# Patient Record
Sex: FEMALE | Race: BLACK OR AFRICAN AMERICAN
[De-identification: names, ages, dates, MRNs, and addresses within clinical notes are randomized per-mention and may not be internally consistent; named-entity substitution may affect disease eponyms.]

---

## 2017-01-25 ENCOUNTER — HOSPITAL ENCOUNTER (OUTPATIENT)
Age: 37
Discharge: HOME | End: 2017-01-25
Payer: COMMERCIAL

## 2017-01-25 DIAGNOSIS — L29.8: Primary | ICD-10-CM

## 2017-03-29 ENCOUNTER — HOSPITAL ENCOUNTER (EMERGENCY)
Age: 37
Discharge: HOME | End: 2017-03-29
Payer: COMMERCIAL

## 2017-03-29 DIAGNOSIS — T86.840: ICD-10-CM
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
## 2017-03-29 DIAGNOSIS — S05.92XA: ICD-10-CM

## 2017-03-29 DIAGNOSIS — H18.50: ICD-10-CM
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
## 2017-03-29 DIAGNOSIS — H54.11: Primary | ICD-10-CM

## 2017-03-29 PROCEDURE — 99283 EMERGENCY DEPT VISIT LOW MDM: CPT

## 2017-03-29 PROCEDURE — 99282 EMERGENCY DEPT VISIT SF MDM: CPT

## 2018-03-25 ENCOUNTER — HOSPITAL ENCOUNTER (EMERGENCY)
Dept: HOSPITAL 76 - ED | Age: 38
Discharge: HOME | End: 2018-03-25
Payer: MEDICAID

## 2018-03-25 VITALS — DIASTOLIC BLOOD PRESSURE: 90 MMHG | SYSTOLIC BLOOD PRESSURE: 147 MMHG

## 2018-03-25 DIAGNOSIS — R10.9: Primary | ICD-10-CM

## 2018-03-25 LAB
ALBUMIN DIAFP-MCNC: 4.2 G/DL (ref 3.2–5.5)
ALBUMIN/GLOB SERPL: 0.9 {RATIO} (ref 1–2.2)
ALP SERPL-CCNC: 57 IU/L (ref 42–121)
ALT SERPL W P-5'-P-CCNC: 27 IU/L (ref 10–60)
ANION GAP SERPL CALCULATED.4IONS-SCNC: 8 MMOL/L (ref 6–13)
AST SERPL W P-5'-P-CCNC: 21 IU/L (ref 10–42)
BASOPHILS NFR BLD AUTO: 0.1 10^3/UL (ref 0–0.1)
BASOPHILS NFR BLD AUTO: 1.1 %
BILIRUB BLD-MCNC: 0.5 MG/DL (ref 0.2–1)
BUN SERPL-MCNC: 6 MG/DL (ref 6–20)
CALCIUM UR-MCNC: 9 MG/DL (ref 8.5–10.3)
CHLORIDE SERPL-SCNC: 101 MMOL/L (ref 101–111)
CLARITY UR REFRACT.AUTO: CLEAR
CO2 SERPL-SCNC: 28 MMOL/L (ref 21–32)
CREAT SERPLBLD-SCNC: 0.5 MG/DL (ref 0.4–1)
EOSINOPHIL # BLD AUTO: 0.3 10^3/UL (ref 0–0.7)
EOSINOPHIL NFR BLD AUTO: 5.7 %
ERYTHROCYTE [DISTWIDTH] IN BLOOD BY AUTOMATED COUNT: 13.7 % (ref 12–15)
GFRSERPLBLD MDRD-ARVRAT: 170 ML/MIN/{1.73_M2} (ref 89–?)
GLOBULIN SER-MCNC: 4.5 G/DL (ref 2.1–4.2)
GLUCOSE SERPL-MCNC: 84 MG/DL (ref 70–100)
GLUCOSE UR QL STRIP.AUTO: NEGATIVE MG/DL
HCG UR QL: NEGATIVE
HGB UR QL STRIP: 13.6 G/DL (ref 12–16)
KETONES UR QL STRIP.AUTO: NEGATIVE MG/DL
LIPASE SERPL-CCNC: 19 U/L (ref 22–51)
LYMPHOCYTES # SPEC AUTO: 2.5 10^3/UL (ref 1.5–3.5)
LYMPHOCYTES NFR BLD AUTO: 45 %
MCH RBC QN AUTO: 30.3 PG (ref 27–31)
MCHC RBC AUTO-ENTMCNC: 33.7 G/DL (ref 32–36)
MCV RBC AUTO: 89.8 FL (ref 81–99)
MONOCYTES # BLD AUTO: 0.7 10^3/UL (ref 0–1)
MONOCYTES NFR BLD AUTO: 11.7 %
NEUTROPHILS # BLD AUTO: 2.1 10^3/UL (ref 1.5–6.6)
NEUTROPHILS # SNV AUTO: 5.7 X10^3/UL (ref 4.8–10.8)
NEUTROPHILS NFR BLD AUTO: 36.5 %
NITRITE UR QL STRIP.AUTO: NEGATIVE
PDW BLD AUTO: 7.7 FL (ref 7.9–10.8)
PH UR STRIP.AUTO: 7 PH (ref 5–7.5)
PLATELET # BLD: 352 10^3/UL (ref 130–450)
PROT SPEC-MCNC: 8.7 G/DL (ref 6.7–8.2)
PROT UR STRIP.AUTO-MCNC: NEGATIVE MG/DL
RBC # UR STRIP.AUTO: NEGATIVE /UL
RBC MAR: 4.5 10^6/UL (ref 4.2–5.4)
SODIUM SERPLBLD-SCNC: 137 MMOL/L (ref 135–145)
SP GR UR STRIP.AUTO: 1.01 (ref 1–1.03)
UROBILINOGEN UR QL STRIP.AUTO: (no result) E.U./DL
UROBILINOGEN UR STRIP.AUTO-MCNC: NEGATIVE MG/DL

## 2018-03-25 PROCEDURE — 81025 URINE PREGNANCY TEST: CPT

## 2018-03-25 PROCEDURE — 85025 COMPLETE CBC W/AUTO DIFF WBC: CPT

## 2018-03-25 PROCEDURE — 87086 URINE CULTURE/COLONY COUNT: CPT

## 2018-03-25 PROCEDURE — 99283 EMERGENCY DEPT VISIT LOW MDM: CPT

## 2018-03-25 PROCEDURE — 81003 URINALYSIS AUTO W/O SCOPE: CPT

## 2018-03-25 PROCEDURE — 81001 URINALYSIS AUTO W/SCOPE: CPT

## 2018-03-25 PROCEDURE — 74177 CT ABD & PELVIS W/CONTRAST: CPT

## 2018-03-25 PROCEDURE — 80053 COMPREHEN METABOLIC PANEL: CPT

## 2018-03-25 PROCEDURE — 99284 EMERGENCY DEPT VISIT MOD MDM: CPT

## 2018-03-25 PROCEDURE — 36415 COLL VENOUS BLD VENIPUNCTURE: CPT

## 2018-03-25 PROCEDURE — 83690 ASSAY OF LIPASE: CPT

## 2018-09-20 ENCOUNTER — HOSPITAL ENCOUNTER (EMERGENCY)
Dept: HOSPITAL 76 - ED | Age: 38
Discharge: HOME | End: 2018-09-20
Payer: MEDICAID

## 2018-09-20 VITALS — SYSTOLIC BLOOD PRESSURE: 120 MMHG | DIASTOLIC BLOOD PRESSURE: 86 MMHG

## 2018-09-20 DIAGNOSIS — J45.901: Primary | ICD-10-CM

## 2018-09-20 DIAGNOSIS — J20.9: ICD-10-CM

## 2018-09-20 DIAGNOSIS — J01.90: ICD-10-CM

## 2018-09-20 PROCEDURE — 94640 AIRWAY INHALATION TREATMENT: CPT

## 2018-09-20 PROCEDURE — 94664 DEMO&/EVAL PT USE INHALER: CPT

## 2018-09-20 PROCEDURE — 99283 EMERGENCY DEPT VISIT LOW MDM: CPT

## 2019-04-06 ENCOUNTER — HOSPITAL ENCOUNTER (EMERGENCY)
Dept: HOSPITAL 76 - ED | Age: 39
Discharge: HOME | End: 2019-04-06
Payer: MEDICAID

## 2019-04-06 VITALS — DIASTOLIC BLOOD PRESSURE: 93 MMHG | SYSTOLIC BLOOD PRESSURE: 136 MMHG

## 2019-04-06 DIAGNOSIS — L98.8: Primary | ICD-10-CM

## 2019-04-06 PROCEDURE — 99283 EMERGENCY DEPT VISIT LOW MDM: CPT

## 2019-04-06 PROCEDURE — 10140 I&D HMTMA SEROMA/FLUID COLLJ: CPT

## 2019-04-06 PROCEDURE — 99282 EMERGENCY DEPT VISIT SF MDM: CPT

## 2019-12-04 ENCOUNTER — HOSPITAL ENCOUNTER (EMERGENCY)
Dept: HOSPITAL 76 - ED | Age: 39
Discharge: HOME | End: 2019-12-04
Payer: COMMERCIAL

## 2019-12-04 VITALS — SYSTOLIC BLOOD PRESSURE: 127 MMHG | DIASTOLIC BLOOD PRESSURE: 75 MMHG

## 2019-12-04 DIAGNOSIS — M94.0: Primary | ICD-10-CM

## 2019-12-04 PROCEDURE — 99284 EMERGENCY DEPT VISIT MOD MDM: CPT

## 2019-12-04 PROCEDURE — 99283 EMERGENCY DEPT VISIT LOW MDM: CPT

## 2019-12-04 PROCEDURE — 71046 X-RAY EXAM CHEST 2 VIEWS: CPT

## 2019-12-16 ENCOUNTER — HOSPITAL ENCOUNTER (OUTPATIENT)
Dept: HOSPITAL 76 - DI | Age: 39
Discharge: HOME | End: 2019-12-16
Attending: PHYSICIAN ASSISTANT
Payer: COMMERCIAL

## 2019-12-16 DIAGNOSIS — N64.4: Primary | ICD-10-CM

## 2019-12-16 PROCEDURE — 77066 DX MAMMO INCL CAD BI: CPT

## 2019-12-16 PROCEDURE — 76642 ULTRASOUND BREAST LIMITED: CPT

## 2020-02-12 ENCOUNTER — HOSPITAL ENCOUNTER (OUTPATIENT)
Dept: HOSPITAL 76 - EMS | Age: 40
Discharge: ANOTHER HOSPITAL | End: 2020-02-12
Attending: SURGERY
Payer: COMMERCIAL

## 2020-02-12 ENCOUNTER — HOSPITAL ENCOUNTER (EMERGENCY)
Dept: HOSPITAL 76 - ED | Age: 40
Discharge: HOME | End: 2020-02-12
Payer: COMMERCIAL

## 2020-02-12 VITALS — DIASTOLIC BLOOD PRESSURE: 100 MMHG | SYSTOLIC BLOOD PRESSURE: 145 MMHG
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
## 2020-02-12 DIAGNOSIS — M25.562: Primary | ICD-10-CM
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
## 2020-02-12 DIAGNOSIS — R03.0: ICD-10-CM
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
## 2020-02-12 DIAGNOSIS — S80.02XA: Primary | ICD-10-CM

## 2020-02-12 PROCEDURE — 73564 X-RAY EXAM KNEE 4 OR MORE: CPT

## 2020-02-12 PROCEDURE — 99283 EMERGENCY DEPT VISIT LOW MDM: CPT

## 2020-08-04 ENCOUNTER — HOSPITAL ENCOUNTER (EMERGENCY)
Age: 40
Discharge: HOME | End: 2020-08-04
Payer: COMMERCIAL

## 2020-08-04 VITALS
RESPIRATION RATE: 23 BRPM | HEART RATE: 74 BPM | SYSTOLIC BLOOD PRESSURE: 146 MMHG | DIASTOLIC BLOOD PRESSURE: 87 MMHG | OXYGEN SATURATION: 99 %

## 2020-08-04 VITALS
RESPIRATION RATE: 18 BRPM | DIASTOLIC BLOOD PRESSURE: 89 MMHG | HEART RATE: 76 BPM | OXYGEN SATURATION: 98 % | SYSTOLIC BLOOD PRESSURE: 152 MMHG

## 2020-08-04 VITALS — RESPIRATION RATE: 22 BRPM | HEART RATE: 80 BPM

## 2020-08-04 VITALS — HEART RATE: 67 BPM | RESPIRATION RATE: 16 BRPM | OXYGEN SATURATION: 99 %

## 2020-08-04 VITALS
SYSTOLIC BLOOD PRESSURE: 185 MMHG | RESPIRATION RATE: 22 BRPM | OXYGEN SATURATION: 97 % | DIASTOLIC BLOOD PRESSURE: 102 MMHG | TEMPERATURE: 99.1 F | HEART RATE: 88 BPM

## 2020-08-04 VITALS
HEART RATE: 73 BPM | OXYGEN SATURATION: 100 % | SYSTOLIC BLOOD PRESSURE: 136 MMHG | RESPIRATION RATE: 19 BRPM | DIASTOLIC BLOOD PRESSURE: 87 MMHG

## 2020-08-04 VITALS — HEART RATE: 68 BPM | OXYGEN SATURATION: 100 % | RESPIRATION RATE: 21 BRPM

## 2020-08-04 VITALS
DIASTOLIC BLOOD PRESSURE: 89 MMHG | RESPIRATION RATE: 25 BRPM | OXYGEN SATURATION: 100 % | SYSTOLIC BLOOD PRESSURE: 152 MMHG | HEART RATE: 82 BPM

## 2020-08-04 VITALS — RESPIRATION RATE: 14 BRPM | OXYGEN SATURATION: 100 %

## 2020-08-04 VITALS — HEART RATE: 74 BPM | RESPIRATION RATE: 17 BRPM

## 2020-08-04 VITALS — HEART RATE: 75 BPM | RESPIRATION RATE: 24 BRPM | SYSTOLIC BLOOD PRESSURE: 148 MMHG | DIASTOLIC BLOOD PRESSURE: 91 MMHG

## 2020-08-04 VITALS — RESPIRATION RATE: 18 BRPM | HEART RATE: 78 BPM

## 2020-08-04 VITALS — HEART RATE: 77 BPM | RESPIRATION RATE: 21 BRPM

## 2020-08-04 DIAGNOSIS — R07.9: ICD-10-CM

## 2020-08-04 DIAGNOSIS — R79.89: ICD-10-CM

## 2020-08-04 DIAGNOSIS — R51: ICD-10-CM

## 2020-08-04 DIAGNOSIS — J45.901: Primary | ICD-10-CM

## 2020-08-04 DIAGNOSIS — R06.82: ICD-10-CM

## 2020-08-04 DIAGNOSIS — R60.9: ICD-10-CM

## 2020-08-04 LAB
ADD MANUAL DIFF / SLIDE REVIEW: NO
ALBUMIN SERPL-MCNC: 5.1 G/DL (ref 3.5–5)
ALBUMIN/GLOB SERPL: 1 {RATIO} (ref 1–2.8)
ALP SERPL-CCNC: 69 U/L (ref 38–126)
ALT SERPL-CCNC: 36 IU/L (ref ?–35)
BUN SERPL-MCNC: 7 MG/DL (ref 7–17)
CALCIUM SERPL-MCNC: 10.2 MG/DL (ref 8.4–10.2)
CHLORIDE SERPL-SCNC: 103 MMOL/L (ref 98–107)
CK SERPL-CCNC: 133 U/L (ref 30–135)
CKMB % RELATIVE INDEX: 0.6 % (ref 1.5–5)
CO2 SERPL-SCNC: 22 MMOL/L (ref 22–32)
CULTURE INDICATED URINE: (no result)
ESTIMATED GLOMERULAR FILT RATE: > 60 ML/MIN (ref 60–?)
FERRITIN SERPL-MCNC: 16 NG/ML (ref 6–137)
GLOBULIN SER CALC-MCNC: 5 G/DL (ref 1.7–4.1)
GLUCOSE SERPL-MCNC: 163 MG/DL (ref 70–100)
HEMATOCRIT: 43.1 % (ref 36–46)
HEMOGLOBIN: 14.4 G/DL (ref 12–16)
HEMOLYSIS: 39 (ref 0–50)
INR PPP: 1.1 (ref 0.9–1.3)
LACTATE SERPL-MCNC: 2.1 MMOL/L (ref 0.7–2.1)
LYMPHOCYTES # SPEC AUTO: 1600 /UL (ref 1100–4500)
MAGNESIUM SERPL-MCNC: 2.1 MG/DL (ref 1.6–2.3)
MCV RBC: 88.5 FL (ref 80–100)
MEAN CORPUSCULAR HEMOGLOBIN: 29.6 PG (ref 26–34)
MEAN CORPUSCULAR HGB CONC: 33.5 % (ref 30–36)
NT-PROBNP SERPL-MCNC: 40 PG/ML (ref ?–125)
PLATELET COUNT: 355 X10^3/UL (ref 150–400)
POTASSIUM SERPL-SCNC: 4.6 MMOL/L (ref 3.4–5.1)
PROCALCITONIN: < 0.05 NG/ML (ref ?–0.5)
PROT SERPL-MCNC: 10.1 G/DL (ref 6.3–8.2)
PROTHROMBIN TIME: 12.8 SECONDS (ref 10.1–12.7)
PTT PARTIAL THROMBOPLASTIN TIM: 28 SECONDS (ref 26.4–36.2)
SODIUM SERPL-SCNC: 139 MMOL/L (ref 137–145)
TROPONIN I SERPL-MCNC: < 0.012 NG/ML (ref 0.01–0.03)
URINE COMMENTS: (no result)

## 2020-08-04 PROCEDURE — 36415 COLL VENOUS BLD VENIPUNCTURE: CPT

## 2020-08-04 PROCEDURE — 71275 CT ANGIOGRAPHY CHEST: CPT

## 2020-08-04 PROCEDURE — 99284 EMERGENCY DEPT VISIT MOD MDM: CPT

## 2020-08-04 PROCEDURE — 83735 ASSAY OF MAGNESIUM: CPT

## 2020-08-04 PROCEDURE — 83880 ASSAY OF NATRIURETIC PEPTIDE: CPT

## 2020-08-04 PROCEDURE — 85651 RBC SED RATE NONAUTOMATED: CPT

## 2020-08-04 PROCEDURE — 96374 THER/PROPH/DIAG INJ IV PUSH: CPT

## 2020-08-04 PROCEDURE — 86140 C-REACTIVE PROTEIN: CPT

## 2020-08-04 PROCEDURE — 85025 COMPLETE CBC W/AUTO DIFF WBC: CPT

## 2020-08-04 PROCEDURE — 85379 FIBRIN DEGRADATION QUANT: CPT

## 2020-08-04 PROCEDURE — 87635 SARS-COV-2 COVID-19 AMP PRB: CPT

## 2020-08-04 PROCEDURE — 84484 ASSAY OF TROPONIN QUANT: CPT

## 2020-08-04 PROCEDURE — 83605 ASSAY OF LACTIC ACID: CPT

## 2020-08-04 PROCEDURE — 93005 ELECTROCARDIOGRAM TRACING: CPT

## 2020-08-04 PROCEDURE — 94150 VITAL CAPACITY TEST: CPT

## 2020-08-04 PROCEDURE — 81003 URINALYSIS AUTO W/O SCOPE: CPT

## 2020-08-04 PROCEDURE — 82550 ASSAY OF CK (CPK): CPT

## 2020-08-04 PROCEDURE — 71046 X-RAY EXAM CHEST 2 VIEWS: CPT

## 2020-08-04 PROCEDURE — 80053 COMPREHEN METABOLIC PANEL: CPT

## 2020-08-04 PROCEDURE — 96361 HYDRATE IV INFUSION ADD-ON: CPT

## 2020-08-04 PROCEDURE — 94640 AIRWAY INHALATION TREATMENT: CPT

## 2020-08-04 PROCEDURE — 87086 URINE CULTURE/COLONY COUNT: CPT

## 2020-08-04 PROCEDURE — 85730 THROMBOPLASTIN TIME PARTIAL: CPT

## 2020-08-04 PROCEDURE — 85610 PROTHROMBIN TIME: CPT

## 2020-08-04 PROCEDURE — 81015 MICROSCOPIC EXAM OF URINE: CPT

## 2020-08-04 PROCEDURE — 82728 ASSAY OF FERRITIN: CPT

## 2020-08-04 PROCEDURE — 82553 CREATINE MB FRACTION: CPT

## 2020-08-04 PROCEDURE — 84145 PROCALCITONIN (PCT): CPT

## 2020-08-04 NOTE — ED_ITS
"HPI - SOB/Dyspnea    
General    
Chief Complaint: Shortness of Breath/Dyspnea    
Stated Complaint: short of breath, headache    
Time Seen by Provider: 08/04/20 15:43    
Source: patient    
Mode of arrival: Ambulatory    
Limitations: no limitations    
History of Present Illness    
HPI Narrative: This is a 39 year old female, nonsmoker, has history of asthma   
and cornea transplant presents to ED with chief complain of mid chest tightness   
with short of breath, and frontal headache and malaise for last 2 days.  Patient  
reports she is not able to take full deep breaths and her short of breath is not  
improving using albuterol inhaler at home.  She reports it does not feels like   
her usual asthma problems.  She usually does not require to use inhaler daily.    
Patient states also noticed bilateral swelling last couple of days but this has   
improved today.  Patient denies calf or lower extremity discomfort.  Patient   
reports she had flew to [place] beginning of last month and returned home.    
She denies on hormone replacement, birth control pill, previous DVT or blood   
clots or previous cardiac history.  Patient reports 1 of her sisters had DVT and  
pulmonary embolism.      
    
Patient also reports frontal headache for last 2 days.  She denies weakness to 1  
extremities, speech difficulty, changes in her visions.  Patient denies neck   
rigidity or tenderness, unusual rashes.  Patient reports had hives 2 days ago   
which has resolved.  Patient denies cough, congestion, sore throat, diarrhea,   
constipation.  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  
contact with possible Covid patient.  Patient reports noticed loss of taste.    
Patient denies history of migraine or tension headaches.  Reports nothing   
improves her headache or aggravates such as nodes, lights.  Patient rates   
headache as 8/10 and constant pounding.    
Related Data    
                                Home Medications    
    
    
    
 Medication  Instructions  Recorded  Confirmed    
     
[ALBUTEROL SULFATE] QDAY #0 08/02/12     
    
    
                                  Previous Rx's    
    
    
    
 Medication  Instructions  Recorded    
     
prednisone 40 mg PO DAILY 5 Days #10 tab 08/04/20    
    
    
    
                                    Allergies    
    
    
    
Allergy/AdvReac Type Severity Reaction Status Date / Time    
     
oats [OATS] Allergy Unknown  Unverified 04/11/18 12:18    
     
DUST Allergy Unknown  Uncoded 04/11/18 12:18    
    
    
    
    
Review of Systems    
Review of Systems    
Narrative: General: Denies fever, chills, (+) fatigue, (+) malaise, sweats.    
    
HEENT: Denies sinus pain, ear pain, sore throat, difficulty swallowing,   
dizziness.    
    
Respiratory: Denies (+) dyspnea, cough, wheezing, hemoptysis, sputum.    
    
Cardiovascular: Denies (+) mid chest pain tightness, palpitations, orthopnea,   
(+) bilateral lower leg edema.    
    
Gastrointestinal: Denies nausea, vomiting, abdominal pain, diarrhea,   
constipation, melena, (+) loss sense of taste.    
    
: Denies dysuria, frequency, incontinence, hematuria, urinary retention.    
    
Musculoskeletal: Denies weakness, joint pain or bony pain.    
    
Skin: Denies rash, skin lesions, or other.    
    
Neurologic: Denies weakness, (+) headache, numbness, change in speech,   
confusion, seizures, incoordination.    
    
Psychiatric: No concerning psychosocial issues.    
    
12-point review of systems is negative except for those stated above.    
    
    
Patient History    
Medical History (Reviewed 08/04/20 @ 16:47 by JORGE Heath)    
    
Asthma (Acute)    
Cornea transplant recipient (Acute)    
    
    
Social History (Reviewed 08/04/20 @ 16:47 by JORGE Heath)    
Smoking Status:  Never smoker     
    
    
Smoking Status: Never smoker    
[facility]
[id number]|2020-08-04 17:30:00|2020-08-04 19:55:00|DI.CT.S_ITS|[facility]|Imaging|[id number]|"PROCEDURE:  CT ANGIO CHEST PE PROTOCOL

## 2020-08-04 NOTE — ED.SOB
"HPI - SOB/Dyspnea
General
Chief Complaint: Shortness of Breath/Dyspnea
Stated Complaint: short of breath, headache
Time Seen by Provider: 08/04/20 15:43
Source: patient
Mode of arrival: Ambulatory
Limitations: no limitations
History of Present Illness
HPI Narrative: This is a 39 year old female, nonsmoker, has history of asthma and cornea transplant presents to ED with chief complain of mid chest tightness with short of breath, and frontal headache and malaise for last 2 days.  Patient reports 
she is not able to take full deep breaths and her short of breath is not improving using albuterol inhaler at home.  She reports it does not feels like her usual asthma problems.  She usually does not require to use inhaler daily.  Patient states 
also noticed bilateral swelling last couple of days but this has improved today.  Patient denies calf or lower extremity discomfort.  Patient reports she had flew to [place] beginning of last month and returned home.  She denies on hormone 
replacement, birth control pill, previous DVT or blood clots or previous cardiac history.  Patient reports 1 of her sisters had DVT and pulmonary embolism.  

Patient also reports frontal headache for last 2 days.  She denies weakness to 1 extremities, speech difficulty, changes in her visions.  Patient denies neck rigidity or tenderness, unusual rashes.  Patient reports had hives 2 days ago which has 
resolved.  Patient denies cough, congestion, sore throat, diarrhea, constipation.  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  Patient reports noticed loss of taste.  Patient denies history 
of migraine or tension headaches.  Reports nothing improves her headache or aggravates such as nodes, lights.  Patient rates headache as 8/10 and constant pounding.
Related Data
Home Medications

 Medication  Instructions  Recorded  Confirmed
[ALBUTEROL SULFATE] QDAY #0 08/02/12 

Previous Rx's

 Medication  Instructions  Recorded
prednisone 40 mg PO DAILY 5 Days #10 tab 08/04/20


Allergies

Allergy/AdvReac Type Severity Reaction Status Date / Time
oats [OATS] Allergy Unknown  Unverified 04/11/18 12:18
DUST Allergy Unknown  Uncoded 04/11/18 12:18



Review of Systems
Review of Systems
Narrative: General: Denies fever, chills, (+) fatigue, (+) malaise, sweats.

HEENT: Denies sinus pain, ear pain, sore throat, difficulty swallowing, dizziness.

Respiratory: Denies (+) dyspnea, cough, wheezing, hemoptysis, sputum.

Cardiovascular: Denies (+) mid chest pain tightness, palpitations, orthopnea, (+) bilateral lower leg edema.

Gastrointestinal: Denies nausea, vomiting, abdominal pain, diarrhea, constipation, melena, (+) loss sense of taste.

: Denies dysuria, frequency, incontinence, hematuria, urinary retention.

Musculoskeletal: Denies weakness, joint pain or bony pain.

Skin: Denies rash, skin lesions, or other.

Neurologic: Denies weakness, (+) headache, numbness, change in speech, confusion, seizures, incoordination.

Psychiatric: No concerning psychosocial issues.

12-point review of systems is negative except for those stated above.


Patient History
Medical History (Reviewed 08/04/20 @ 16:47 by JORGE Heath)

Asthma (Acute)
Cornea transplant recipient (Acute)


Social History (Reviewed 08/04/20 @ 16:47 by JORGE Heath)
Smoking Status:  Never smoker 


Smoking Status: Never smoker
Substance Use Type: does not use

Exam
Narrative
Exam Narrative: GEN: Alert, oriented x 3, well appearing and nourished, and in no acute distress.

Head: Normal cephalic, atraumatic.  No scalp or temporal tenderness, palpable mass or rash.  

EYES: Pupils are equal, round, and reactive to light and accommodation.  Extraocular muscles are intact.  There is no subconjunctival hemorrhage, exudate and sclera non-icteric.

ENT: Hearing grossly intact.  Nose without bleeding, purulent discharge, septal hematoma or deviation.  [place]
[id number]|2020-08-04 16:33:09|2020-08-04 16:33:09|ED.ALCOHOL||||"HPI - Alcohol

## 2020-10-20 ENCOUNTER — HOSPITAL ENCOUNTER (OUTPATIENT)
Dept: HOSPITAL 76 - COV | Age: 40
End: 2020-10-20
Attending: FAMILY MEDICINE
Payer: COMMERCIAL

## 2020-10-20 DIAGNOSIS — R50.9: ICD-10-CM

## 2020-10-20 DIAGNOSIS — R09.81: ICD-10-CM

## 2020-10-20 DIAGNOSIS — R53.83: ICD-10-CM

## 2020-10-20 DIAGNOSIS — R06.02: ICD-10-CM

## 2020-10-20 DIAGNOSIS — Z20.828: ICD-10-CM

## 2020-10-20 DIAGNOSIS — J02.9: ICD-10-CM

## 2020-10-20 DIAGNOSIS — M79.10: ICD-10-CM

## 2020-10-20 DIAGNOSIS — R43.8: ICD-10-CM

## 2020-10-20 DIAGNOSIS — R05: Primary | ICD-10-CM

## 2020-10-20 DIAGNOSIS — R11.2: ICD-10-CM

## 2021-02-21 ENCOUNTER — HOSPITAL ENCOUNTER (EMERGENCY)
Age: 41
LOS: 1 days | Discharge: HOME | End: 2021-02-22
Payer: COMMERCIAL

## 2021-02-21 VITALS
OXYGEN SATURATION: 99 % | HEART RATE: 75 BPM | RESPIRATION RATE: 20 BRPM | TEMPERATURE: 97.52 F | SYSTOLIC BLOOD PRESSURE: 187 MMHG | DIASTOLIC BLOOD PRESSURE: 95 MMHG

## 2021-02-21 VITALS — HEART RATE: 71 BPM | DIASTOLIC BLOOD PRESSURE: 73 MMHG | SYSTOLIC BLOOD PRESSURE: 145 MMHG

## 2021-02-21 VITALS — DIASTOLIC BLOOD PRESSURE: 83 MMHG | SYSTOLIC BLOOD PRESSURE: 155 MMHG

## 2021-02-21 VITALS — BODY MASS INDEX: 36 KG/M2

## 2021-02-21 VITALS
SYSTOLIC BLOOD PRESSURE: 168 MMHG | RESPIRATION RATE: 18 BRPM | DIASTOLIC BLOOD PRESSURE: 70 MMHG | HEART RATE: 65 BPM | OXYGEN SATURATION: 98 %

## 2021-02-21 DIAGNOSIS — Z20.822: ICD-10-CM

## 2021-02-21 DIAGNOSIS — J45.909: ICD-10-CM

## 2021-02-21 DIAGNOSIS — R42: Primary | ICD-10-CM

## 2021-02-21 DIAGNOSIS — R11.0: ICD-10-CM

## 2021-02-21 DIAGNOSIS — R10.13: ICD-10-CM

## 2021-02-21 DIAGNOSIS — R51.9: ICD-10-CM

## 2021-02-21 LAB
ADD MANUAL DIFF / SLIDE REVIEW: NO
ALBUMIN SERPL-MCNC: 4.4 G/DL (ref 3.5–5)
ALBUMIN/GLOB SERPL: 1 {RATIO} (ref 1–2.8)
ALP SERPL-CCNC: 72 U/L (ref 38–126)
ALT SERPL-CCNC: 36 IU/L (ref ?–35)
BUN SERPL-MCNC: 10 MG/DL (ref 7–17)
CALCIUM SERPL-MCNC: 9.5 MG/DL (ref 8.4–10.2)
CHLORIDE SERPL-SCNC: 103 MMOL/L (ref 98–107)
CK SERPL-CCNC: 171 U/L (ref 30–135)
CKMB % RELATIVE INDEX: 0.7 % (ref 1.5–5)
CO2 SERPL-SCNC: 30 MMOL/L (ref 22–32)
ESTIMATED GLOMERULAR FILT RATE: > 60 ML/MIN (ref 60–?)
GLOBULIN SER CALC-MCNC: 4.3 G/DL (ref 1.7–4.1)
GLUCOSE SERPL-MCNC: 97 MG/DL (ref 70–100)
HEMATOCRIT: 39.1 % (ref 36–46)
HEMOGLOBIN: 13 G/DL (ref 12–16)
HEMOLYSIS: < 15 (ref 0–50)
INR PPP: 1 (ref 0.9–1.3)
LIPASE SERPL-CCNC: 92 U/L (ref 23–300)
LYMPHOCYTES # SPEC AUTO: 3100 /UL (ref 1100–4500)
MCV RBC: 88.5 FL (ref 80–100)
MEAN CORPUSCULAR HEMOGLOBIN: 29.5 PG (ref 26–34)
MEAN CORPUSCULAR HGB CONC: 33.3 % (ref 30–36)
PLATELET COUNT: 360 X10^3/UL (ref 150–400)
POTASSIUM SERPL-SCNC: 3.9 MMOL/L (ref 3.4–5.1)
PROT SERPL-MCNC: 8.7 G/DL (ref 6.3–8.2)
PROTHROMBIN TIME: 11.7 SECONDS (ref 10.1–12.7)
PTT PARTIAL THROMBOPLASTIN TIM: 29 SECONDS (ref 26.4–36.2)
SODIUM SERPL-SCNC: 139 MMOL/L (ref 137–145)
TROPONIN I SERPL-MCNC: < 0.012 NG/ML (ref 0.01–0.03)

## 2021-02-21 PROCEDURE — 82553 CREATINE MB FRACTION: CPT

## 2021-02-21 PROCEDURE — 80053 COMPREHEN METABOLIC PANEL: CPT

## 2021-02-21 PROCEDURE — 87635 SARS-COV-2 COVID-19 AMP PRB: CPT

## 2021-02-21 PROCEDURE — 82550 ASSAY OF CK (CPK): CPT

## 2021-02-21 PROCEDURE — 81003 URINALYSIS AUTO W/O SCOPE: CPT

## 2021-02-21 PROCEDURE — 84484 ASSAY OF TROPONIN QUANT: CPT

## 2021-02-21 PROCEDURE — 96374 THER/PROPH/DIAG INJ IV PUSH: CPT

## 2021-02-21 PROCEDURE — 70450 CT HEAD/BRAIN W/O DYE: CPT

## 2021-02-21 PROCEDURE — 93005 ELECTROCARDIOGRAM TRACING: CPT

## 2021-02-21 PROCEDURE — C9113 INJ PANTOPRAZOLE SODIUM, VIA: HCPCS

## 2021-02-21 PROCEDURE — 99284 EMERGENCY DEPT VISIT MOD MDM: CPT

## 2021-02-21 PROCEDURE — 81025 URINE PREGNANCY TEST: CPT

## 2021-02-21 PROCEDURE — 85730 THROMBOPLASTIN TIME PARTIAL: CPT

## 2021-02-21 PROCEDURE — 96375 TX/PRO/DX INJ NEW DRUG ADDON: CPT

## 2021-02-21 PROCEDURE — 36415 COLL VENOUS BLD VENIPUNCTURE: CPT

## 2021-02-21 PROCEDURE — 85025 COMPLETE CBC W/AUTO DIFF WBC: CPT

## 2021-02-21 PROCEDURE — 83690 ASSAY OF LIPASE: CPT

## 2021-02-21 PROCEDURE — 71045 X-RAY EXAM CHEST 1 VIEW: CPT

## 2021-02-21 PROCEDURE — 85610 PROTHROMBIN TIME: CPT

## 2021-02-21 PROCEDURE — 96361 HYDRATE IV INFUSION ADD-ON: CPT

## 2021-02-21 NOTE — DI.CT.S_ITS
PROCEDURE:  CT HEAD/BRAIN WO CON  
   
INDICATIONS:  headache, hx migraines but different pattern  
   
TECHNIQUE:    
Noncontrast 4.5 mm thick angled axial sections acquired from the foramen magnum to the   
vertex, with coronal and sagittal reformats.  For radiation dose reduction, the following   
was used:  automated exposure control, adjustment of mA and/or kV according to patient   
size.    
   
COMPARISON:  None.  
   
FINDINGS:    
Image quality:  Excellent.    
   
CSF spaces:  Basal cisterns are patent.  No extra-axial fluid collections.  Ventricles   
are normal in size and shape.    
   
Brain:  No midline shift.  No intracranial masses or hemorrhage.  Gray-white matter   
interface is normal.    
   
Skull and face:  Calvarium and visualized facial bones are intact, without suspicious   
lesions.    
   
Sinuses:  Visualized sinuses and mastoids are clear.    
   
IMPRESSION:  No acute intracranial abnormality.  
   
No significant discrepancy with the night shift radiology preliminary report.  
   
   
Dictated by: Consuelo Story M.D. on 2/22/2021 at 7:49       
Approved by: Consuelo Story M.D. on 2/22/2021 at 7:50

## 2021-02-21 NOTE — DI.RAD.S_ITS
PROCEDURE:  XR CHEST 1V  
   
INDICATIONS:  chest pain  
   
TECHNIQUE:  One view of the chest was acquired.    
   
COMPARISON:  [facility], CR, XR CHEST 2V, 8/04/2020, 16:08.  
   
FINDINGS:    
   
Surgical changes and devices:  None.    
   
Lungs and pleura:  Lungs are clear.  No pleural effusions or pneumothorax.    
   
Mediastinum:  Mediastinal contours appear normal.  Heart size is normal.    
   
Bones and chest wall:  No suspicious bony lesions.  Overlying soft tissues appear   
unremarkable.    
   
IMPRESSION:  No acute cardiopulmonary disease.  
   
   
Dictated by: Consuelo Story M.D. on 2/22/2021 at 8:35       
Approved by: Consuelo Story M.D. on 2/22/2021 at 8:38

## 2021-02-22 VITALS
RESPIRATION RATE: 16 BRPM | SYSTOLIC BLOOD PRESSURE: 114 MMHG | HEART RATE: 64 BPM | DIASTOLIC BLOOD PRESSURE: 66 MMHG | OXYGEN SATURATION: 98 %

## 2021-02-22 VITALS
SYSTOLIC BLOOD PRESSURE: 130 MMHG | HEART RATE: 59 BPM | OXYGEN SATURATION: 100 % | RESPIRATION RATE: 14 BRPM | DIASTOLIC BLOOD PRESSURE: 79 MMHG

## 2021-02-22 VITALS
HEART RATE: 60 BPM | SYSTOLIC BLOOD PRESSURE: 136 MMHG | DIASTOLIC BLOOD PRESSURE: 75 MMHG | OXYGEN SATURATION: 100 % | RESPIRATION RATE: 18 BRPM

## 2021-03-04 ENCOUNTER — HOSPITAL ENCOUNTER (OUTPATIENT)
Age: 41
End: 2021-03-04
Payer: COMMERCIAL

## 2021-03-04 DIAGNOSIS — R10.9: Primary | ICD-10-CM

## 2021-03-04 DIAGNOSIS — R11.0: ICD-10-CM

## 2021-03-04 LAB
ADD MANUAL DIFF / SLIDE REVIEW: NO
ALBUMIN SERPL-MCNC: 4.8 G/DL (ref 3.5–5)
ALBUMIN/GLOB SERPL: 1.2 {RATIO} (ref 1–2.8)
ALP SERPL-CCNC: 71 U/L (ref 38–126)
ALT SERPL-CCNC: 25 IU/L (ref ?–35)
BUN SERPL-MCNC: 7 MG/DL (ref 7–17)
CALCIUM SERPL-MCNC: 9.9 MG/DL (ref 8.4–10.2)
CHLORIDE SERPL-SCNC: 101 MMOL/L (ref 98–107)
CO2 SERPL-SCNC: 30 MMOL/L (ref 22–32)
ESTIMATED GLOMERULAR FILT RATE: > 60 ML/MIN (ref 60–?)
GLOBULIN SER CALC-MCNC: 3.9 G/DL (ref 1.7–4.1)
GLUCOSE SERPL-MCNC: 80 MG/DL (ref 70–100)
HEMATOCRIT: 39.6 % (ref 36–46)
HEMOGLOBIN: 13.2 G/DL (ref 12–16)
HEMOLYSIS: < 15 (ref 0–50)
LIPASE SERPL-CCNC: 81 U/L (ref 23–300)
LYMPHOCYTES # SPEC AUTO: 3300 /UL (ref 1100–4500)
MCV RBC: 89.2 FL (ref 80–100)
MEAN CORPUSCULAR HEMOGLOBIN: 29.7 PG (ref 26–34)
MEAN CORPUSCULAR HGB CONC: 33.3 % (ref 30–36)
PLATELET COUNT: 312 X10^3/UL (ref 150–400)
POTASSIUM SERPL-SCNC: 3.8 MMOL/L (ref 3.4–5.1)
PROT SERPL-MCNC: 8.7 G/DL (ref 6.3–8.2)
SODIUM SERPL-SCNC: 139 MMOL/L (ref 137–145)

## 2021-03-04 PROCEDURE — 36415 COLL VENOUS BLD VENIPUNCTURE: CPT

## 2021-03-04 PROCEDURE — 81025 URINE PREGNANCY TEST: CPT

## 2021-03-04 PROCEDURE — 83690 ASSAY OF LIPASE: CPT

## 2021-03-04 PROCEDURE — 83013 H PYLORI (C-13) BREATH: CPT

## 2021-03-04 PROCEDURE — 80053 COMPREHEN METABOLIC PANEL: CPT

## 2021-03-04 PROCEDURE — 85025 COMPLETE CBC W/AUTO DIFF WBC: CPT

## 2021-03-09 LAB — UREA BREATH TEST >18YRS: NEGATIVE

## 2021-03-15 ENCOUNTER — HOSPITAL ENCOUNTER (OUTPATIENT)
Age: 41
End: 2021-03-15
Payer: COMMERCIAL

## 2021-03-15 DIAGNOSIS — R10.9: Primary | ICD-10-CM

## 2021-03-15 PROCEDURE — 76700 US EXAM ABDOM COMPLETE: CPT

## 2021-06-10 NOTE — ED_ITS
"HPI - Dizziness    
General    
Chief Complaint: Dizziness    
Stated Complaint: lightheaded, feels like she might pass out    
Time Seen by Provider: 02/21/21 22:31    
Source: patient and family (son is at bedside)    
Mode of arrival: Ambulatory    
Limitations: no limitations    
History of Present Illness    
HPI Narrative: This is a 40-year-old female comes in complaining of feeling   
lightheaded.  Patient states that she noted about 6:00 p.m. this evening that   
she started feeling sort of dizzy she describes it is feeling little bit   
lightheaded or unsteady.  She feels like she might pass out although she has not  
had any actual syncopal episodes or near syncope.  She denies any headache   
initally but does describe pressure in her head, she denies any new vision   
changes, she denies any chest pain or pressure.  She currently denies any short  
ness of breath.  She has had some nausea but denies any vomiting.  She has had   
some epigastric discomfort with some radiation to midabdomen.  She has had some   
mild right flank discomfort as well.  No changes to bowel movements, no diarrhea  
or constipation.  She denies any urinary symptoms such as frequency, dysuria or   
urgency.  Denies any numbness, tingling or weakness in her extremities.  She sta  
inessa that she feels better when she sits now and worse when she is standing.  She  
has not had similar symptoms in the past.  She states she has a history of   
asthma, she uses albuterol, she also has a history of corneal transplant   
bilaterally and prior glaucoma surgery.  She denies any allergies to me  
dications.  No tobacco, alcohol or illicit.  She does not currently have a   
primary care but he is to follow through would be community clinic.  Her   
father's had cardiac issues, she has multiple family members with diabetes and   
hypertension but denies any other pulmonary, embolic or other major medical   
conditions and her family.      
Related Data    
                                Home Medications    
    
    
    
 Medication  Instructions  Recorded  Confirmed    
     
[ALBUTEROL SULFATE] QDAY #0 08/02/12     
    
    
                                  Previous Rx's    
    
    
    
 Medication  Instructions  Recorded    
     
butalbital-acetaminophen-caff 1 cap PO Q6H PRN #10 cap 02/22/21    
    
[Fioricet]      
    
    
    
                                    Allergies    
    
    
    
Allergy/AdvReac Type Severity Reaction Status Date / Time    
     
oats [OATS] Allergy Unknown  Verified 02/22/21 01:16    
     
DUST Allergy Unknown  Uncoded 04/11/18 12:18    
    
    
    
    
Review of Systems    
Review of Systems    
ROS Unobtainable: All systems reviewed & are unremarkable except as noted in HPI  
 and below    
    
Patient History    
Medical History (Updated 02/22/21 @ 00:56 by Parisa Ledezma DO)    
    
Asthma    
Cornea transplant recipient    
    
    
Social History (Reviewed 02/21/21 @ 22:50 by Parisa Ledezma DO)    
Smoking Status:  Never smoker     
    
    
Smoking Status: Never smoker    
alcohol intake frequency: 0-2 drinks per day    
Substance Use Type: does not use    
    
Exam    
Narrative    
Exam Narrative: GEN: well nourished, well appearing female, alert and oriented x  
 3, patient appears to be in mild distress.    
HEENT: Atraumatic, pupils are equal round reactive to light, extraocular   
movements are intact, nares are clear    
HEART: Regular rate and rhythm without murmur, clicks, rubs.      
LUNGS:Lungs clear to auscultation, no wheezes, rales, crackles, chest moves   
symmetrically, no tachypnea accessory muscle use.    
ABD:bowel sounds normal, soft, mild epigastric tenderness, no guarding, rebound,  
 rigidity, no masses noted, no hepatosplenomegaly    
:No CVA tenderness    
MSCL: Non-tender, no muscle atrophy, muscles strength 5/5 upper and lower   
extremities, full range of motion    
[id number]|2021-02-21 22:45:43|2021-02-21 22:45:43|ED.DIZZY||||"HPI - Dizziness
color consistent with ethnicity/race

## 2021-07-28 ENCOUNTER — HOSPITAL ENCOUNTER (OUTPATIENT)
Age: 41
End: 2021-07-28
Payer: COMMERCIAL

## 2021-07-28 DIAGNOSIS — J31.2: Primary | ICD-10-CM

## 2021-07-28 PROCEDURE — 87077 CULTURE AEROBIC IDENTIFY: CPT

## 2021-07-28 PROCEDURE — 87147 CULTURE TYPE IMMUNOLOGIC: CPT

## 2021-07-28 PROCEDURE — 87070 CULTURE OTHR SPECIMN AEROBIC: CPT

## 2021-08-06 ENCOUNTER — HOSPITAL ENCOUNTER (OUTPATIENT)
Age: 41
End: 2021-08-06
Payer: COMMERCIAL

## 2021-08-06 DIAGNOSIS — R06.02: Primary | ICD-10-CM

## 2021-08-06 PROCEDURE — 71046 X-RAY EXAM CHEST 2 VIEWS: CPT

## 2021-10-20 ENCOUNTER — HOSPITAL ENCOUNTER (OUTPATIENT)
Age: 41
End: 2021-10-20
Payer: COMMERCIAL

## 2021-10-20 DIAGNOSIS — Z3A.08: ICD-10-CM

## 2021-10-20 DIAGNOSIS — Z34.81: Primary | ICD-10-CM

## 2021-10-20 PROCEDURE — 76817 TRANSVAGINAL US OBSTETRIC: CPT

## 2021-10-20 PROCEDURE — 76801 OB US < 14 WKS SINGLE FETUS: CPT

## 2021-12-15 ENCOUNTER — HOSPITAL ENCOUNTER (OUTPATIENT)
Age: 41
End: 2021-12-15
Payer: COMMERCIAL

## 2021-12-15 DIAGNOSIS — O44.22: ICD-10-CM

## 2021-12-15 DIAGNOSIS — O99.891: Primary | ICD-10-CM

## 2021-12-15 DIAGNOSIS — O36.80X0: ICD-10-CM

## 2021-12-15 DIAGNOSIS — Z3A.16: ICD-10-CM

## 2021-12-15 DIAGNOSIS — R10.11: ICD-10-CM

## 2021-12-15 PROCEDURE — 76705 ECHO EXAM OF ABDOMEN: CPT

## 2021-12-15 PROCEDURE — 76815 OB US LIMITED FETUS(S): CPT

## 2021-12-15 PROCEDURE — 76817 TRANSVAGINAL US OBSTETRIC: CPT

## 2022-02-03 ENCOUNTER — HOSPITAL ENCOUNTER (OUTPATIENT)
Age: 42
End: 2022-02-03
Payer: COMMERCIAL

## 2022-02-03 DIAGNOSIS — Z3A.24: ICD-10-CM

## 2022-02-03 DIAGNOSIS — Z36.89: Primary | ICD-10-CM

## 2022-02-03 PROCEDURE — 76811 OB US DETAILED SNGL FETUS: CPT

## 2022-03-24 ENCOUNTER — HOSPITAL ENCOUNTER (OUTPATIENT)
Age: 42
End: 2022-03-24
Payer: COMMERCIAL

## 2022-03-24 DIAGNOSIS — Z34.80: Primary | ICD-10-CM

## 2022-03-24 LAB
ADD MANUAL DIFF / SLIDE REVIEW: NO
GTT (PREG) 1 HOUR PP 50GM DOSE: 186 MG/DL (ref 76–139)
HEMATOCRIT: 33.1 % (ref 36–46)
HEMOGLOBIN: 11.2 G/DL (ref 12–16)
LYMPHOCYTES # SPEC AUTO: 2000 /UL (ref 1100–4500)
MCV RBC: 89.1 FL (ref 80–100)
MEAN CORPUSCULAR HEMOGLOBIN: 30.3 PG (ref 26–34)
MEAN CORPUSCULAR HGB CONC: 34 % (ref 30–36)
PLATELET COUNT: 286 X10^3/UL (ref 150–400)

## 2022-03-24 PROCEDURE — 36415 COLL VENOUS BLD VENIPUNCTURE: CPT

## 2022-03-24 PROCEDURE — 82950 GLUCOSE TEST: CPT

## 2022-03-24 PROCEDURE — 85025 COMPLETE CBC W/AUTO DIFF WBC: CPT

## 2022-04-04 ENCOUNTER — HOSPITAL ENCOUNTER (OUTPATIENT)
Age: 42
End: 2022-04-04
Payer: COMMERCIAL

## 2022-04-04 DIAGNOSIS — Z71.3: ICD-10-CM

## 2022-04-04 DIAGNOSIS — O24.419: Primary | ICD-10-CM

## 2022-04-04 DIAGNOSIS — Z3A.32: ICD-10-CM

## 2022-04-04 PROCEDURE — 97802 MEDICAL NUTRITION INDIV IN: CPT

## 2022-04-06 ENCOUNTER — HOSPITAL ENCOUNTER (OUTPATIENT)
Age: 42
Setting detail: OBSERVATION
Discharge: HOME | End: 2022-04-06
Payer: COMMERCIAL

## 2022-04-06 DIAGNOSIS — Z3A.32: ICD-10-CM

## 2022-04-06 DIAGNOSIS — O09.523: ICD-10-CM

## 2022-04-06 DIAGNOSIS — O47.03: ICD-10-CM

## 2022-04-06 DIAGNOSIS — O24.419: Primary | ICD-10-CM

## 2022-04-06 LAB
ADD MANUAL DIFF / SLIDE REVIEW: NO
ALBUMIN SERPL-MCNC: 3.9 G/DL (ref 3.5–5)
ALBUMIN/GLOB SERPL: 0.9 {RATIO} (ref 1–2.8)
ALP SERPL-CCNC: 78 U/L (ref 38–126)
ALT SERPL-CCNC: 16 IU/L (ref ?–35)
APPEARANCE UR: CLEAR
BILIRUBIN URINE UA: NEGATIVE
BUN SERPL-MCNC: 5 MG/DL (ref 7–17)
CALCIUM SERPL-MCNC: 9.2 MG/DL (ref 8.4–10.2)
CHLORIDE SERPL-SCNC: 104 MMOL/L (ref 98–107)
CO2 SERPL-SCNC: 23 MMOL/L (ref 22–32)
COLOR UR: YELLOW
ESTIMATED GLOMERULAR FILT RATE: > 60 ML/MIN (ref 60–?)
GLOBULIN SER CALC-MCNC: 4.2 G/DL (ref 1.7–4.1)
GLUCOSE SERPL-MCNC: 89 MG/DL (ref 70–100)
GLUCOSE URINE UA: NEGATIVE G/DL
HEMATOCRIT: 32.7 % (ref 36–46)
HEMOGLOBIN: 11.2 G/DL (ref 12–16)
HEMOLYSIS: < 15 (ref 0–50)
HGB UR QL: NEGATIVE
KETONES URINE UA: NEGATIVE
LEUKOCYTE ESTERASE URINE UA: (no result)
LYMPHOCYTES # SPEC AUTO: 2200 /UL (ref 1100–4500)
MCV RBC: 88.4 FL (ref 80–100)
MEAN CORPUSCULAR HEMOGLOBIN: 30.2 PG (ref 26–34)
MEAN CORPUSCULAR HGB CONC: 34.2 % (ref 30–36)
NITRITE URINE UA: NEGATIVE
PH UR: 7.5 [PH] (ref 4.5–8)
PLATELET COUNT: 305 X10^3/UL (ref 150–400)
POTASSIUM SERPL-SCNC: 3.9 MMOL/L (ref 3.4–5.1)
PROT SERPL-MCNC: 8.1 G/DL (ref 6.3–8.2)
PROTEIN URINE UA: NEGATIVE
SODIUM SERPL-SCNC: 136 MMOL/L (ref 137–145)
SP GR UR: 1.01 (ref 1–1.03)
URINE COMMENTS: (no result)
UROBILINOGEN UR QL: 0.2 E.U./DL

## 2022-04-06 PROCEDURE — 59025 FETAL NON-STRESS TEST: CPT

## 2022-04-06 PROCEDURE — 36415 COLL VENOUS BLD VENIPUNCTURE: CPT

## 2022-04-06 PROCEDURE — G0378 HOSPITAL OBSERVATION PER HR: HCPCS

## 2022-04-06 PROCEDURE — 80053 COMPREHEN METABOLIC PANEL: CPT

## 2022-04-06 PROCEDURE — 81001 URINALYSIS AUTO W/SCOPE: CPT

## 2022-04-06 PROCEDURE — 85025 COMPLETE CBC W/AUTO DIFF WBC: CPT

## 2022-04-06 PROCEDURE — 76819 FETAL BIOPHYS PROFIL W/O NST: CPT

## 2022-04-06 PROCEDURE — G0379 DIRECT REFER HOSPITAL OBSERV: HCPCS

## 2022-04-11 ENCOUNTER — HOSPITAL ENCOUNTER (OUTPATIENT)
Dept: HOSPITAL 73 - OB | Age: 42
Discharge: HOME | End: 2022-04-11
Payer: COMMERCIAL

## 2022-04-11 DIAGNOSIS — O24.419: Primary | ICD-10-CM

## 2022-04-11 DIAGNOSIS — Z3A.33: ICD-10-CM

## 2022-04-11 PROCEDURE — 59025 FETAL NON-STRESS TEST: CPT

## 2022-04-11 PROCEDURE — G0379 DIRECT REFER HOSPITAL OBSERV: HCPCS

## 2022-04-11 PROCEDURE — G0378 HOSPITAL OBSERVATION PER HR: HCPCS

## 2022-04-13 ENCOUNTER — HOSPITAL ENCOUNTER (OUTPATIENT)
Dept: HOSPITAL 73 - LABOR | Age: 42
Discharge: HOME | End: 2022-04-13
Payer: COMMERCIAL

## 2022-04-13 DIAGNOSIS — Z3A.33: ICD-10-CM

## 2022-04-13 DIAGNOSIS — O26.893: Primary | ICD-10-CM

## 2022-04-13 DIAGNOSIS — R10.2: ICD-10-CM

## 2022-04-13 PROCEDURE — 59025 FETAL NON-STRESS TEST: CPT

## 2022-04-13 PROCEDURE — G0379 DIRECT REFER HOSPITAL OBSERV: HCPCS

## 2022-04-13 PROCEDURE — G0378 HOSPITAL OBSERVATION PER HR: HCPCS

## 2022-04-14 ENCOUNTER — HOSPITAL ENCOUNTER (OUTPATIENT)
Age: 42
End: 2022-04-14
Payer: COMMERCIAL

## 2022-04-14 DIAGNOSIS — O24.410: Primary | ICD-10-CM

## 2022-04-14 DIAGNOSIS — Z71.3: ICD-10-CM

## 2022-04-14 DIAGNOSIS — Z3A.33: ICD-10-CM

## 2022-04-14 PROCEDURE — 97803 MED NUTRITION INDIV SUBSEQ: CPT

## 2022-04-15 ENCOUNTER — HOSPITAL ENCOUNTER (OUTPATIENT)
Dept: HOSPITAL 73 - LABOR | Age: 42
Discharge: HOME | End: 2022-04-15
Payer: COMMERCIAL

## 2022-04-15 DIAGNOSIS — O24.419: Primary | ICD-10-CM

## 2022-04-15 DIAGNOSIS — O09.523: ICD-10-CM

## 2022-04-15 DIAGNOSIS — Z3A.33: ICD-10-CM

## 2022-04-15 PROCEDURE — G0378 HOSPITAL OBSERVATION PER HR: HCPCS

## 2022-04-15 PROCEDURE — G0379 DIRECT REFER HOSPITAL OBSERV: HCPCS

## 2022-04-15 PROCEDURE — 59025 FETAL NON-STRESS TEST: CPT

## 2022-04-18 ENCOUNTER — HOSPITAL ENCOUNTER (OUTPATIENT)
Dept: HOSPITAL 73 - OB | Age: 42
Discharge: HOME | End: 2022-04-18
Payer: COMMERCIAL

## 2022-04-18 DIAGNOSIS — Z3A.34: ICD-10-CM

## 2022-04-18 DIAGNOSIS — O09.523: ICD-10-CM

## 2022-04-18 DIAGNOSIS — O24.419: Primary | ICD-10-CM

## 2022-04-18 PROCEDURE — G0378 HOSPITAL OBSERVATION PER HR: HCPCS

## 2022-04-18 PROCEDURE — 59025 FETAL NON-STRESS TEST: CPT

## 2022-04-18 PROCEDURE — G0379 DIRECT REFER HOSPITAL OBSERV: HCPCS

## 2022-04-21 ENCOUNTER — HOSPITAL ENCOUNTER (OUTPATIENT)
Dept: HOSPITAL 73 - LABOR | Age: 42
Discharge: HOME | End: 2022-04-21
Payer: COMMERCIAL

## 2022-04-21 DIAGNOSIS — O24.419: Primary | ICD-10-CM

## 2022-04-21 DIAGNOSIS — Z3A.34: ICD-10-CM

## 2022-04-21 PROCEDURE — G0379 DIRECT REFER HOSPITAL OBSERV: HCPCS

## 2022-04-21 PROCEDURE — 59025 FETAL NON-STRESS TEST: CPT

## 2022-04-21 PROCEDURE — G0378 HOSPITAL OBSERVATION PER HR: HCPCS

## 2022-04-22 ENCOUNTER — HOSPITAL ENCOUNTER (OUTPATIENT)
Age: 42
End: 2022-04-22
Payer: COMMERCIAL

## 2022-04-22 DIAGNOSIS — O24.415: Primary | ICD-10-CM

## 2022-04-22 DIAGNOSIS — Z3A.34: ICD-10-CM

## 2022-04-22 DIAGNOSIS — Z71.3: ICD-10-CM

## 2022-04-22 PROCEDURE — G0108 DIAB MANAGE TRN  PER INDIV: HCPCS

## 2022-04-25 ENCOUNTER — HOSPITAL ENCOUNTER (OUTPATIENT)
Dept: HOSPITAL 73 - LABOR | Age: 42
Discharge: HOME | End: 2022-04-25
Payer: COMMERCIAL

## 2022-04-25 DIAGNOSIS — O09.523: ICD-10-CM

## 2022-04-25 DIAGNOSIS — O24.419: Primary | ICD-10-CM

## 2022-04-25 DIAGNOSIS — Z3A.35: ICD-10-CM

## 2022-04-25 PROCEDURE — 59025 FETAL NON-STRESS TEST: CPT

## 2022-04-25 PROCEDURE — G0379 DIRECT REFER HOSPITAL OBSERV: HCPCS

## 2022-04-25 PROCEDURE — G0378 HOSPITAL OBSERVATION PER HR: HCPCS

## 2022-04-28 ENCOUNTER — HOSPITAL ENCOUNTER (OUTPATIENT)
Dept: HOSPITAL 73 - LABOR | Age: 42
Discharge: HOME | End: 2022-04-28
Payer: COMMERCIAL

## 2022-04-28 DIAGNOSIS — O24.415: Primary | ICD-10-CM

## 2022-04-28 DIAGNOSIS — Z3A.35: ICD-10-CM

## 2022-04-28 PROCEDURE — 59025 FETAL NON-STRESS TEST: CPT

## 2022-04-28 PROCEDURE — G0379 DIRECT REFER HOSPITAL OBSERV: HCPCS

## 2022-04-28 PROCEDURE — G0378 HOSPITAL OBSERVATION PER HR: HCPCS

## 2022-05-02 ENCOUNTER — HOSPITAL ENCOUNTER (OUTPATIENT)
Dept: HOSPITAL 73 - LABOR | Age: 42
Discharge: HOME | End: 2022-05-02
Payer: COMMERCIAL

## 2022-05-02 DIAGNOSIS — O09.523: ICD-10-CM

## 2022-05-02 DIAGNOSIS — O24.415: ICD-10-CM

## 2022-05-02 DIAGNOSIS — Z3A.36: ICD-10-CM

## 2022-05-02 DIAGNOSIS — O47.03: Primary | ICD-10-CM

## 2022-05-02 PROCEDURE — G0378 HOSPITAL OBSERVATION PER HR: HCPCS

## 2022-05-02 PROCEDURE — G0379 DIRECT REFER HOSPITAL OBSERV: HCPCS

## 2022-05-02 PROCEDURE — 59025 FETAL NON-STRESS TEST: CPT

## 2022-05-04 ENCOUNTER — HOSPITAL ENCOUNTER (OUTPATIENT)
Age: 42
Setting detail: OBSERVATION
Discharge: HOME | End: 2022-05-04
Payer: COMMERCIAL

## 2022-05-04 DIAGNOSIS — Z3A.36: ICD-10-CM

## 2022-05-04 DIAGNOSIS — O47.03: Primary | ICD-10-CM

## 2022-05-04 LAB
APPEARANCE UR: CLEAR
BILIRUBIN URINE UA: NEGATIVE
COLOR UR: YELLOW
CULTURE INDICATED URINE: (no result)
GLUCOSE URINE UA: NEGATIVE G/DL
HGB UR QL: NEGATIVE
KETONES URINE UA: NEGATIVE
LEUKOCYTE ESTERASE URINE UA: NEGATIVE
NITRITE URINE UA: NEGATIVE
PH UR: 6.5 [PH] (ref 4.5–8)
PROTEIN URINE UA: NEGATIVE
SP GR UR: <=1.005 (ref 1–1.03)
UROBILINOGEN UR QL: 0.2 E.U./DL

## 2022-05-04 PROCEDURE — 59050 FETAL MONITOR W/REPORT: CPT

## 2022-05-04 PROCEDURE — G0378 HOSPITAL OBSERVATION PER HR: HCPCS

## 2022-05-04 PROCEDURE — 59025 FETAL NON-STRESS TEST: CPT

## 2022-05-04 PROCEDURE — 81001 URINALYSIS AUTO W/SCOPE: CPT

## 2022-05-04 PROCEDURE — G0379 DIRECT REFER HOSPITAL OBSERV: HCPCS

## 2022-05-04 PROCEDURE — 76819 FETAL BIOPHYS PROFIL W/O NST: CPT

## 2022-05-04 PROCEDURE — 87086 URINE CULTURE/COLONY COUNT: CPT

## 2022-05-05 ENCOUNTER — HOSPITAL ENCOUNTER (OUTPATIENT)
Dept: HOSPITAL 73 - LABOR | Age: 42
Discharge: HOME | End: 2022-05-05
Payer: COMMERCIAL

## 2022-05-05 ENCOUNTER — HOSPITAL ENCOUNTER
Age: 42
End: 2022-05-05
Payer: COMMERCIAL

## 2022-05-05 DIAGNOSIS — O24.419: Primary | ICD-10-CM

## 2022-05-05 DIAGNOSIS — Z34.80: ICD-10-CM

## 2022-05-05 DIAGNOSIS — Z3A.36: ICD-10-CM

## 2022-05-05 DIAGNOSIS — Z34.80: Primary | ICD-10-CM

## 2022-05-05 PROCEDURE — G0379 DIRECT REFER HOSPITAL OBSERV: HCPCS

## 2022-05-05 PROCEDURE — 59025 FETAL NON-STRESS TEST: CPT

## 2022-05-05 PROCEDURE — 87081 CULTURE SCREEN ONLY: CPT

## 2022-05-05 PROCEDURE — G0378 HOSPITAL OBSERVATION PER HR: HCPCS

## 2022-05-09 ENCOUNTER — HOSPITAL ENCOUNTER (OUTPATIENT)
Dept: HOSPITAL 73 - LABOR | Age: 42
Discharge: HOME | End: 2022-05-09
Payer: COMMERCIAL

## 2022-05-09 VITALS
HEART RATE: 93 BPM | TEMPERATURE: 97.52 F | SYSTOLIC BLOOD PRESSURE: 111 MMHG | RESPIRATION RATE: 20 BRPM | DIASTOLIC BLOOD PRESSURE: 59 MMHG

## 2022-05-09 DIAGNOSIS — O24.415: Primary | ICD-10-CM

## 2022-05-09 DIAGNOSIS — Z3A.37: ICD-10-CM

## 2022-05-09 PROCEDURE — G0379 DIRECT REFER HOSPITAL OBSERV: HCPCS

## 2022-05-09 PROCEDURE — 59025 FETAL NON-STRESS TEST: CPT

## 2022-05-09 PROCEDURE — G0378 HOSPITAL OBSERVATION PER HR: HCPCS

## 2022-05-12 ENCOUNTER — HOSPITAL ENCOUNTER (OUTPATIENT)
Dept: HOSPITAL 73 - LABOR | Age: 42
Discharge: HOME | End: 2022-05-12
Payer: COMMERCIAL

## 2022-05-12 VITALS
HEART RATE: 93 BPM | TEMPERATURE: 96.44 F | DIASTOLIC BLOOD PRESSURE: 73 MMHG | SYSTOLIC BLOOD PRESSURE: 131 MMHG | RESPIRATION RATE: 20 BRPM

## 2022-05-12 DIAGNOSIS — O24.415: Primary | ICD-10-CM

## 2022-05-12 DIAGNOSIS — Z3A.37: ICD-10-CM

## 2022-05-12 PROCEDURE — 59025 FETAL NON-STRESS TEST: CPT

## 2022-05-12 PROCEDURE — G0378 HOSPITAL OBSERVATION PER HR: HCPCS

## 2022-05-12 PROCEDURE — G0379 DIRECT REFER HOSPITAL OBSERV: HCPCS

## 2022-05-16 ENCOUNTER — HOSPITAL ENCOUNTER (OUTPATIENT)
Dept: HOSPITAL 73 - LABOR | Age: 42
Discharge: HOME | End: 2022-05-16
Payer: COMMERCIAL

## 2022-05-16 DIAGNOSIS — O24.415: Primary | ICD-10-CM

## 2022-05-16 DIAGNOSIS — O47.1: ICD-10-CM

## 2022-05-16 DIAGNOSIS — Z3A.38: ICD-10-CM

## 2022-05-16 PROCEDURE — 59025 FETAL NON-STRESS TEST: CPT

## 2022-05-16 PROCEDURE — G0378 HOSPITAL OBSERVATION PER HR: HCPCS

## 2022-05-16 PROCEDURE — G0379 DIRECT REFER HOSPITAL OBSERV: HCPCS

## 2022-05-16 PROCEDURE — 76819 FETAL BIOPHYS PROFIL W/O NST: CPT

## 2022-05-20 ENCOUNTER — HOSPITAL ENCOUNTER (OUTPATIENT)
Dept: HOSPITAL 73 - LABOR | Age: 42
Discharge: HOME | End: 2022-05-20
Payer: COMMERCIAL

## 2022-05-20 DIAGNOSIS — O47.1: ICD-10-CM

## 2022-05-20 DIAGNOSIS — O24.415: Primary | ICD-10-CM

## 2022-05-20 DIAGNOSIS — O09.523: ICD-10-CM

## 2022-05-20 DIAGNOSIS — Z3A.38: ICD-10-CM

## 2022-05-20 PROCEDURE — 59025 FETAL NON-STRESS TEST: CPT

## 2022-05-20 PROCEDURE — G0378 HOSPITAL OBSERVATION PER HR: HCPCS

## 2022-05-20 PROCEDURE — G0379 DIRECT REFER HOSPITAL OBSERV: HCPCS

## 2022-05-25 ENCOUNTER — HOSPITAL ENCOUNTER (INPATIENT)
Age: 42
LOS: 2 days | Discharge: HOME | End: 2022-05-27
Payer: COMMERCIAL

## 2022-05-25 DIAGNOSIS — O99.891: ICD-10-CM

## 2022-05-25 DIAGNOSIS — Z3A.39: ICD-10-CM

## 2022-05-25 DIAGNOSIS — J45.909: ICD-10-CM

## 2022-05-25 DIAGNOSIS — Z20.822: ICD-10-CM

## 2022-05-25 LAB
ADD MANUAL DIFF / SLIDE REVIEW: NO
HEMATOCRIT: 32.1 % (ref 36–46)
HEMOGLOBIN: 11 G/DL (ref 12–16)
LYMPHOCYTES # SPEC AUTO: 2000 /UL (ref 1100–4500)
MCV RBC: 86.1 FL (ref 80–100)
MEAN CORPUSCULAR HEMOGLOBIN: 29.5 PG (ref 26–34)
MEAN CORPUSCULAR HGB CONC: 34.3 % (ref 30–36)
PLATELET COUNT: 308 X10^3/UL (ref 150–400)

## 2022-05-25 PROCEDURE — 85018 HEMOGLOBIN: CPT

## 2022-05-25 PROCEDURE — 86850 RBC ANTIBODY SCREEN: CPT

## 2022-05-25 PROCEDURE — 87635 SARS-COV-2 COVID-19 AMP PRB: CPT

## 2022-05-25 PROCEDURE — S0191 MISOPROSTOL, ORAL, 200 MCG: HCPCS

## 2022-05-25 PROCEDURE — 86901 BLOOD TYPING SEROLOGIC RH(D): CPT

## 2022-05-25 PROCEDURE — 85014 HEMATOCRIT: CPT

## 2022-05-25 PROCEDURE — 59050 FETAL MONITOR W/REPORT: CPT

## 2022-05-25 PROCEDURE — 86900 BLOOD TYPING SEROLOGIC ABO: CPT

## 2022-05-25 PROCEDURE — 85025 COMPLETE CBC W/AUTO DIFF WBC: CPT

## 2022-05-25 PROCEDURE — G0379 DIRECT REFER HOSPITAL OBSERV: HCPCS

## 2022-05-25 PROCEDURE — 36415 COLL VENOUS BLD VENIPUNCTURE: CPT

## 2022-05-26 VITALS — TEMPERATURE: 96.9 F

## 2022-05-27 VITALS
SYSTOLIC BLOOD PRESSURE: 108 MMHG | RESPIRATION RATE: 16 BRPM | DIASTOLIC BLOOD PRESSURE: 56 MMHG | TEMPERATURE: 97.16 F | HEART RATE: 66 BPM

## 2022-05-27 VITALS
TEMPERATURE: 97.16 F | SYSTOLIC BLOOD PRESSURE: 108 MMHG | HEART RATE: 66 BPM | RESPIRATION RATE: 16 BRPM | DIASTOLIC BLOOD PRESSURE: 56 MMHG

## 2022-05-27 LAB
HEMATOCRIT: 28.1 % (ref 36–46)
HEMOGLOBIN: 9.4 G/DL (ref 12–16)

## 2023-04-27 ENCOUNTER — HOSPITAL ENCOUNTER (OUTPATIENT)
Age: 43
End: 2023-04-27
Payer: COMMERCIAL

## 2023-04-27 DIAGNOSIS — M16.11: Primary | ICD-10-CM

## 2023-04-27 DIAGNOSIS — M25.551: ICD-10-CM

## 2023-04-27 PROCEDURE — 72100 X-RAY EXAM L-S SPINE 2/3 VWS: CPT

## 2023-04-27 PROCEDURE — 73502 X-RAY EXAM HIP UNI 2-3 VIEWS: CPT

## 2023-09-25 ENCOUNTER — HOSPITAL ENCOUNTER (EMERGENCY)
Age: 43
Discharge: HOME | End: 2023-09-25
Payer: COMMERCIAL

## 2023-09-25 VITALS — SYSTOLIC BLOOD PRESSURE: 163 MMHG | DIASTOLIC BLOOD PRESSURE: 98 MMHG | OXYGEN SATURATION: 100 % | HEART RATE: 70 BPM

## 2023-09-25 VITALS
OXYGEN SATURATION: 98 % | HEART RATE: 82 BPM | RESPIRATION RATE: 16 BRPM | DIASTOLIC BLOOD PRESSURE: 105 MMHG | TEMPERATURE: 98 F | SYSTOLIC BLOOD PRESSURE: 178 MMHG

## 2023-09-25 VITALS
OXYGEN SATURATION: 99 % | DIASTOLIC BLOOD PRESSURE: 81 MMHG | RESPIRATION RATE: 15 BRPM | SYSTOLIC BLOOD PRESSURE: 139 MMHG | HEART RATE: 67 BPM

## 2023-09-25 VITALS — OXYGEN SATURATION: 99 % | HEART RATE: 66 BPM

## 2023-09-25 VITALS
OXYGEN SATURATION: 100 % | RESPIRATION RATE: 15 BRPM | HEART RATE: 66 BPM | SYSTOLIC BLOOD PRESSURE: 146 MMHG | DIASTOLIC BLOOD PRESSURE: 93 MMHG

## 2023-09-25 VITALS — BODY MASS INDEX: 41.1 KG/M2

## 2023-09-25 VITALS
OXYGEN SATURATION: 99 % | RESPIRATION RATE: 14 BRPM | HEART RATE: 63 BPM | SYSTOLIC BLOOD PRESSURE: 150 MMHG | DIASTOLIC BLOOD PRESSURE: 92 MMHG

## 2023-09-25 VITALS — HEART RATE: 67 BPM | RESPIRATION RATE: 17 BRPM | OXYGEN SATURATION: 100 %

## 2023-09-25 DIAGNOSIS — Z86.16: ICD-10-CM

## 2023-09-25 DIAGNOSIS — R51.9: Primary | ICD-10-CM

## 2023-09-25 LAB
ADD MANUAL DIFF / SLIDE REVIEW: NO
ALBUMIN SERPL-MCNC: 4.4 G/DL (ref 3.5–5)
ALBUMIN/GLOB SERPL: 1 {RATIO} (ref 1–2.8)
ALP SERPL-CCNC: 61 U/L (ref 38–126)
ALT SERPL-CCNC: 28 IU/L (ref ?–35)
BUN SERPL-MCNC: 6 MG/DL (ref 7–17)
CALCIUM SERPL-MCNC: 9.4 MG/DL (ref 8.4–10.2)
CHLORIDE SERPL-SCNC: 104 MMOL/L (ref 98–107)
CO2 SERPL-SCNC: 28 MMOL/L (ref 22–32)
ESTIMATED GLOMERULAR FILT RATE: > 60 ML/MIN (ref 60–?)
GLOBULIN SER CALC-MCNC: 4.2 G/DL (ref 1.7–4.1)
GLUCOSE SERPL-MCNC: 100 MG/DL (ref 70–100)
HEMATOCRIT: 38.4 % (ref 36–46)
HEMOGLOBIN: 13.1 G/DL (ref 12–16)
HEMOLYSIS: 38 (ref 0–50)
LIPASE SERPL-CCNC: 80 U/L (ref 23–300)
LYMPHOCYTES # SPEC AUTO: 2600 /UL (ref 1100–4500)
MCV RBC: 89.5 FL (ref 80–100)
MEAN CORPUSCULAR HEMOGLOBIN: 30.6 PG (ref 26–34)
MEAN CORPUSCULAR HGB CONC: 34.2 % (ref 30–36)
PLATELET COUNT: 315 X10^3/UL (ref 150–400)
POTASSIUM SERPL-SCNC: 3.9 MMOL/L (ref 3.4–5.1)
PROT SERPL-MCNC: 8.6 G/DL (ref 6.3–8.2)
SODIUM SERPL-SCNC: 140 MMOL/L (ref 137–145)

## 2023-09-25 PROCEDURE — 81025 URINE PREGNANCY TEST: CPT

## 2023-09-25 PROCEDURE — 80053 COMPREHEN METABOLIC PANEL: CPT

## 2023-09-25 PROCEDURE — 96374 THER/PROPH/DIAG INJ IV PUSH: CPT

## 2023-09-25 PROCEDURE — 81003 URINALYSIS AUTO W/O SCOPE: CPT

## 2023-09-25 PROCEDURE — 99284 EMERGENCY DEPT VISIT MOD MDM: CPT

## 2023-09-25 PROCEDURE — 81015 MICROSCOPIC EXAM OF URINE: CPT

## 2023-09-25 PROCEDURE — 36415 COLL VENOUS BLD VENIPUNCTURE: CPT

## 2023-09-25 PROCEDURE — 83690 ASSAY OF LIPASE: CPT

## 2023-09-25 PROCEDURE — 70450 CT HEAD/BRAIN W/O DYE: CPT

## 2023-09-25 PROCEDURE — 85025 COMPLETE CBC W/AUTO DIFF WBC: CPT

## 2023-09-25 PROCEDURE — 96375 TX/PRO/DX INJ NEW DRUG ADDON: CPT

## 2023-10-21 ENCOUNTER — HOSPITAL ENCOUNTER (OUTPATIENT)
Dept: HOSPITAL 76 - DI | Age: 43
Discharge: HOME | End: 2023-10-21
Attending: PHYSICIAN ASSISTANT
Payer: COMMERCIAL

## 2023-10-21 DIAGNOSIS — S93.401A: Primary | ICD-10-CM

## 2024-02-18 ENCOUNTER — HOSPITAL ENCOUNTER (OUTPATIENT)
Dept: HOSPITAL 76 - DI | Age: 44
Discharge: HOME | End: 2024-02-18
Attending: FAMILY MEDICINE
Payer: COMMERCIAL

## 2024-02-18 DIAGNOSIS — M75.31: Primary | ICD-10-CM

## 2024-02-18 DIAGNOSIS — S80.01XA: ICD-10-CM

## 2024-02-18 DIAGNOSIS — S39.012A: ICD-10-CM

## 2025-03-18 ENCOUNTER — HOSPITAL ENCOUNTER (OUTPATIENT)
Age: 45
End: 2025-03-18
Payer: COMMERCIAL

## 2025-03-18 DIAGNOSIS — G89.29: ICD-10-CM

## 2025-03-18 DIAGNOSIS — M16.0: ICD-10-CM

## 2025-03-18 DIAGNOSIS — M25.551: ICD-10-CM

## 2025-03-18 DIAGNOSIS — M54.41: Primary | ICD-10-CM

## 2025-03-18 PROCEDURE — 72100 X-RAY EXAM L-S SPINE 2/3 VWS: CPT

## 2025-03-18 PROCEDURE — 73502 X-RAY EXAM HIP UNI 2-3 VIEWS: CPT

## 2025-03-18 NOTE — DI.RAD.S_ITS
PROCEDURE:  XR LUMBAR SPINE 2-3V 
  
INDICATIONS:  BILATERAL LOWER BACK PAIN 
  
TECHNIQUE:  3 views of the lumbar spine were acquired.   
  
COMPARISON:  [facility], CR, XR LUMBAR SPINE 2-3V, 4/27/2023, 15:37. 
  
FINDINGS:   
  
Bones:  5 non-rib-bearing vertebrae are present.  There is normal bony alignment.  No  
vertebral body compression fractures.  No suspicious bony lesions.   
  
Soft tissues:  Overlying bowel gas pattern is normal.  No suspicious soft tissue  
calcifications.   
  
  
IMPRESSION:   
  
No acute bony abnormality. 
  
  
Dictated by: Andrew Hutchinson M.D. on 3/19/2025 at 1:58      
Approved by: Andrew Hutchinson M.D. on 3/19/2025 at 1:58

## 2025-03-18 NOTE — DI.RAD.S_ITS
PROCEDURE:  XR HIP W PEL IF DONE RT 2V 
  
INDICATIONS:  BILATERAL BACK PAIN 
  
TECHNIQUE:  AP pelvis with lateral view(s) of the left hip(s).   
  
COMPARISON:  [facility], CR, XR HIP W PEL IF DONE RT 2V, 4/27/2023, 15:35. 
  
FINDINGS:   
  
Bones:  There are no osseous abnormalities. 
  
SI and hip joints:  Mild degeneration in both hips.  SI joints normal. 
  
Soft tissues:  No soft tissue swelling, calcification or mass. 
  
  
IMPRESSION:   
  
Mild bilateral hip degeneration  
  
  
Dictated by: Surendra Betancourt M.D. on 3/19/2025 at 7:37      
Approved by: Surendra Betancourt M.D. on 3/19/2025 at 7:38